# Patient Record
Sex: MALE | Race: ASIAN
[De-identification: names, ages, dates, MRNs, and addresses within clinical notes are randomized per-mention and may not be internally consistent; named-entity substitution may affect disease eponyms.]

---

## 2021-05-03 ENCOUNTER — APPOINTMENT (OUTPATIENT)
Dept: UROLOGY | Facility: CLINIC | Age: 21
End: 2021-05-03
Payer: COMMERCIAL

## 2021-05-03 VITALS
HEART RATE: 79 BPM | SYSTOLIC BLOOD PRESSURE: 128 MMHG | WEIGHT: 155 LBS | TEMPERATURE: 97.2 F | DIASTOLIC BLOOD PRESSURE: 65 MMHG | BODY MASS INDEX: 24.33 KG/M2 | HEIGHT: 67 IN

## 2021-05-03 DIAGNOSIS — N50.82 SCROTAL PAIN: ICD-10-CM

## 2021-05-03 PROCEDURE — 99204 OFFICE O/P NEW MOD 45 MIN: CPT

## 2021-05-03 PROCEDURE — 99072 ADDL SUPL MATRL&STAF TM PHE: CPT

## 2021-05-04 NOTE — LETTER BODY
[FreeTextEntry1] : Bernadette Cheung MD, PhD\par [address], [address]\[place] [address]\par [phone]\par \par Dear Dr. Cheung,\par \par Reason for Visit: Previous scrotal pain. 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 Patient is referred for evaluation of his condition. The patient notes that he has had 3 episodes of testicular pain in the last 5 years. He recently presented to his local hospital ED with scrotal pain. He was diagnosed with possible intermittent testicular torsion. He is currently asymptomatic. Patient denies any gross hematuria or dysuria or urinary incontinence. The patient denies any aggravating or relieving factors. The patient denies any interference of function. The patient is entirely asymptomatic. All other review of systems are negative. He has no cancer in his family medical history. He has had bladder surgery. Past medical history, family history and social history were inquired and were noncontributory to current condition. The patient does not use tobacco or drink alcohol. Medications and allergies were reviewed. He has no known allergies to medication. \par \par On examination, the patient is a healthy-appearing gentleman in no acute distress. He is alert and oriented follows commands. He has normal mood and affect. He is normocephalic. Oral no thrush. Neck is supple. Respirations are unlabored. His abdomen is soft and nontender. Liver is nonpalpable. Bladder is nonpalpable. No CVA tenderness. Neurologically he is grossly intact. No peripheral edema. Skin without gross abnormality. He has normal male external genitalia. Normal meatus. Bilateral testes are descended intrascrotally and normal to palpation. \par \par He scrotal US demonstrated normal flow to both testes. \par  \par Assessment: Previous scrotal pain. Possible intermittent testicular torsion.\par \par I counseled the patient. I discussed the various etiologies of his symptoms. I recommended the patient consider undergoing orchiopexy. I counseled the patient regarding the procedure. The risks and benefits were discussed. Alternatives were given. I answered the patient questions. The patient will consider the procedure and discuss with his parents are also physicians. I asked him to contact me if he has recurrent pain.. Risks and alternatives were discussed. I answered the patient questions. The patient will follow-up as directed and will contact me with any questions or concerns. Thank you for the opportunity to participate in the care of Mr. MOSHER. I will keep you updated on his progress.\par \par Plan: Consider orchiopexy. Follow-up as directed.

## 2021-05-04 NOTE — ADDENDUM
[FreeTextEntry1] : Entered by Juan Cope, acting as scribe for Dr. Ricardo Avila.\par \par The documentation recorded by the scribe accurately reflects the service I personally performed and the decisions made by me.\par

## 2024-12-20 ENCOUNTER — NON-APPOINTMENT (OUTPATIENT)
Age: 24
End: 2024-12-20